# Patient Record
Sex: FEMALE | Race: WHITE | Employment: OTHER | URBAN - METROPOLITAN AREA
[De-identification: names, ages, dates, MRNs, and addresses within clinical notes are randomized per-mention and may not be internally consistent; named-entity substitution may affect disease eponyms.]

---

## 2019-04-20 ENCOUNTER — HOSPITAL ENCOUNTER (INPATIENT)
Facility: HOSPITAL | Age: 59
LOS: 3 days | Discharge: HOME | DRG: 871 | End: 2019-04-25
Attending: EMERGENCY MEDICINE | Admitting: HOSPITALIST
Payer: COMMERCIAL

## 2019-04-20 ENCOUNTER — APPOINTMENT (OUTPATIENT)
Dept: GENERAL RADIOLOGY | Facility: HOSPITAL | Age: 59
DRG: 871 | End: 2019-04-20
Attending: EMERGENCY MEDICINE
Payer: COMMERCIAL

## 2019-04-20 ENCOUNTER — APPOINTMENT (OUTPATIENT)
Dept: CT IMAGING | Facility: HOSPITAL | Age: 59
DRG: 871 | End: 2019-04-20
Attending: EMERGENCY MEDICINE
Payer: COMMERCIAL

## 2019-04-20 DIAGNOSIS — E86.0 DEHYDRATION: ICD-10-CM

## 2019-04-20 DIAGNOSIS — I95.9 HYPOTENSION, UNSPECIFIED HYPOTENSION TYPE: ICD-10-CM

## 2019-04-20 DIAGNOSIS — R10.9 ABDOMINAL PAIN OF UNKNOWN ETIOLOGY: Primary | ICD-10-CM

## 2019-04-20 PROCEDURE — 74176 CT ABD & PELVIS W/O CONTRAST: CPT | Performed by: EMERGENCY MEDICINE

## 2019-04-20 PROCEDURE — 99244 OFF/OP CNSLTJ NEW/EST MOD 40: CPT | Performed by: SURGERY

## 2019-04-20 PROCEDURE — 71045 X-RAY EXAM CHEST 1 VIEW: CPT | Performed by: EMERGENCY MEDICINE

## 2019-04-20 PROCEDURE — 99223 1ST HOSP IP/OBS HIGH 75: CPT | Performed by: HOSPITALIST

## 2019-04-20 RX ORDER — EZETIMIBE 10 MG/1
10 TABLET ORAL NIGHTLY
COMMUNITY

## 2019-04-20 RX ORDER — ROSUVASTATIN CALCIUM 20 MG/1
20 TABLET, COATED ORAL NIGHTLY
COMMUNITY

## 2019-04-20 RX ORDER — MONTELUKAST SODIUM 10 MG/1
10 TABLET ORAL NIGHTLY
COMMUNITY

## 2019-04-20 RX ORDER — DOCUSATE SODIUM 100 MG/1
100 CAPSULE, LIQUID FILLED ORAL DAILY PRN
COMMUNITY

## 2019-04-20 RX ORDER — POLYETHYLENE GLYCOL 3350 17 G/17G
17 POWDER, FOR SOLUTION ORAL DAILY PRN
Status: DISCONTINUED | OUTPATIENT
Start: 2019-04-20 | End: 2019-04-25

## 2019-04-20 RX ORDER — RANITIDINE 150 MG/1
150 TABLET ORAL 2 TIMES DAILY
COMMUNITY

## 2019-04-20 RX ORDER — SODIUM CHLORIDE 9 MG/ML
INJECTION, SOLUTION INTRAVENOUS CONTINUOUS
Status: DISCONTINUED | OUTPATIENT
Start: 2019-04-20 | End: 2019-04-22

## 2019-04-20 RX ORDER — INSULIN ASPART 100 [IU]/ML
INJECTION, SOLUTION INTRAVENOUS; SUBCUTANEOUS
COMMUNITY

## 2019-04-20 RX ORDER — INSULIN DETEMIR 100 [IU]/ML
36 INJECTION, SOLUTION SUBCUTANEOUS 2 TIMES DAILY
COMMUNITY

## 2019-04-20 RX ORDER — ACETAMINOPHEN 325 MG/1
650 TABLET ORAL EVERY 6 HOURS PRN
Status: DISCONTINUED | OUTPATIENT
Start: 2019-04-20 | End: 2019-04-23

## 2019-04-20 RX ORDER — SPIRONOLACTONE 25 MG/1
25 TABLET ORAL DAILY
COMMUNITY

## 2019-04-20 RX ORDER — BIOTIN 1 MG
1000 TABLET ORAL DAILY
COMMUNITY

## 2019-04-20 RX ORDER — FAMOTIDINE 20 MG/1
20 TABLET ORAL DAILY
Status: DISCONTINUED | OUTPATIENT
Start: 2019-04-20 | End: 2019-04-23

## 2019-04-20 RX ORDER — LOSARTAN POTASSIUM 50 MG/1
50 TABLET ORAL DAILY
COMMUNITY

## 2019-04-20 RX ORDER — FENOFIBRATE 67 MG/1
67 CAPSULE ORAL
Status: DISCONTINUED | OUTPATIENT
Start: 2019-04-21 | End: 2019-04-25

## 2019-04-20 RX ORDER — EZETIMIBE 10 MG/1
10 TABLET ORAL NIGHTLY
Status: DISCONTINUED | OUTPATIENT
Start: 2019-04-20 | End: 2019-04-25

## 2019-04-20 RX ORDER — METFORMIN HYDROCHLORIDE 500 MG/1
500 TABLET, EXTENDED RELEASE ORAL 2 TIMES DAILY
COMMUNITY

## 2019-04-20 RX ORDER — MONTELUKAST SODIUM 10 MG/1
10 TABLET ORAL NIGHTLY
Status: DISCONTINUED | OUTPATIENT
Start: 2019-04-20 | End: 2019-04-25

## 2019-04-20 RX ORDER — SODIUM CHLORIDE 9 MG/ML
INJECTION, SOLUTION INTRAVENOUS CONTINUOUS
Status: DISCONTINUED | OUTPATIENT
Start: 2019-04-20 | End: 2019-04-21

## 2019-04-20 RX ORDER — MELATONIN
1000 DAILY
COMMUNITY

## 2019-04-20 RX ORDER — METOCLOPRAMIDE HYDROCHLORIDE 5 MG/ML
5 INJECTION INTRAMUSCULAR; INTRAVENOUS EVERY 8 HOURS PRN
Status: DISCONTINUED | OUTPATIENT
Start: 2019-04-20 | End: 2019-04-25

## 2019-04-20 RX ORDER — ONDANSETRON 2 MG/ML
4 INJECTION INTRAMUSCULAR; INTRAVENOUS EVERY 6 HOURS PRN
Status: DISCONTINUED | OUTPATIENT
Start: 2019-04-20 | End: 2019-04-25

## 2019-04-20 RX ORDER — MULTIVITAMIN WITH IRON
250 TABLET ORAL DAILY
COMMUNITY

## 2019-04-20 RX ORDER — HYDROMORPHONE HYDROCHLORIDE 1 MG/ML
0.5 INJECTION, SOLUTION INTRAMUSCULAR; INTRAVENOUS; SUBCUTANEOUS ONCE
Status: COMPLETED | OUTPATIENT
Start: 2019-04-20 | End: 2019-04-20

## 2019-04-20 RX ORDER — HYDROCHLOROTHIAZIDE 25 MG/1
12.5 TABLET ORAL DAILY
COMMUNITY

## 2019-04-20 RX ORDER — MULTIVITAMIN/IRON/FOLIC ACID 18MG-0.4MG
250 TABLET ORAL DAILY
Status: DISCONTINUED | OUTPATIENT
Start: 2019-04-20 | End: 2019-04-25

## 2019-04-20 RX ORDER — ASPIRIN 81 MG/1
81 TABLET ORAL DAILY
Status: DISCONTINUED | OUTPATIENT
Start: 2019-04-20 | End: 2019-04-25

## 2019-04-20 RX ORDER — ONDANSETRON 2 MG/ML
4 INJECTION INTRAMUSCULAR; INTRAVENOUS ONCE
Status: COMPLETED | OUTPATIENT
Start: 2019-04-20 | End: 2019-04-20

## 2019-04-20 RX ORDER — ROSUVASTATIN CALCIUM 20 MG/1
20 TABLET, COATED ORAL NIGHTLY
Status: DISCONTINUED | OUTPATIENT
Start: 2019-04-20 | End: 2019-04-25

## 2019-04-20 NOTE — ED INITIAL ASSESSMENT (HPI)
Per  pt had an eye issue on Thursday,friday woke up nauseated, no appetite,today nauseated and dizzy, pt is diabetic on insulin, bs was 156, pt rubbing abd states very tender rlq, states pain is 9/10 abd pain

## 2019-04-20 NOTE — ED PROVIDER NOTES
Patient Seen in: [facility] Emergency Department    History   Patient presents with:  Abdomen/Flank Pain (GI/)  Fatigue (constitutional, neurologic)    Stated Complaint: General malise, abdominal pain, nausea     HPI    58-year-old white female wh discomfort but no acute distress. Vital signs show she is hypotensive otherwise vital signs are stable she is afebrile    Head is normocephalic atraumatic conjunctiva is clear. Sclerae anicteric. Neck is supple.     Lungs are clear to auscultation bila DIFFERENTIAL WITH PLATELET.   Procedure                               Abnormality         Status                     ---------                               -----------         ------                     CBC W/ DIFFERENTIAL[[id number]]          Abnormal likely present within the visualized   colon. ABDOMINAL WALL:  Some induration within the subcutaneous fat of the lower anterior pelvic wall is noted.  Correlation for prior injections in this region is suggested.   URINARY BLADDER:  The urinary bladder is her hypotension. Finally after a complete sepsis fluid bolus patient's blood pressure normalized. Reexam should patient continue to have abdominal pain in the right lower quadrant. I contacted the general surgeon Dr. Khadra Marsh and we discussed the case.   I

## 2019-04-21 ENCOUNTER — APPOINTMENT (OUTPATIENT)
Dept: GENERAL RADIOLOGY | Facility: HOSPITAL | Age: 59
DRG: 871 | End: 2019-04-21
Attending: NURSE PRACTITIONER
Payer: COMMERCIAL

## 2019-04-21 ENCOUNTER — APPOINTMENT (OUTPATIENT)
Dept: CV DIAGNOSTICS | Facility: HOSPITAL | Age: 59
DRG: 871 | End: 2019-04-21
Attending: NURSE PRACTITIONER
Payer: COMMERCIAL

## 2019-04-21 PROCEDURE — 93306 TTE W/DOPPLER COMPLETE: CPT | Performed by: NURSE PRACTITIONER

## 2019-04-21 PROCEDURE — 71045 X-RAY EXAM CHEST 1 VIEW: CPT | Performed by: NURSE PRACTITIONER

## 2019-04-21 PROCEDURE — 02HV33Z INSERTION OF INFUSION DEVICE INTO SUPERIOR VENA CAVA, PERCUTANEOUS APPROACH: ICD-10-PCS | Performed by: HOSPITALIST

## 2019-04-21 PROCEDURE — 99233 SBSQ HOSP IP/OBS HIGH 50: CPT | Performed by: STUDENT IN AN ORGANIZED HEALTH CARE EDUCATION/TRAINING PROGRAM

## 2019-04-21 PROCEDURE — 99225 SUBSEQUENT OBSERVATION CARE: CPT | Performed by: SURGERY

## 2019-04-21 RX ORDER — HYDROMORPHONE HYDROCHLORIDE 1 MG/ML
0.5 INJECTION, SOLUTION INTRAMUSCULAR; INTRAVENOUS; SUBCUTANEOUS EVERY 4 HOURS PRN
Status: DISCONTINUED | OUTPATIENT
Start: 2019-04-21 | End: 2019-04-22

## 2019-04-21 RX ORDER — TRAMADOL HYDROCHLORIDE 50 MG/1
50 TABLET ORAL EVERY 6 HOURS PRN
Status: DISCONTINUED | OUTPATIENT
Start: 2019-04-21 | End: 2019-04-25

## 2019-04-21 RX ORDER — SODIUM CHLORIDE 0.9 % (FLUSH) 0.9 %
10 SYRINGE (ML) INJECTION AS NEEDED
Status: DISCONTINUED | OUTPATIENT
Start: 2019-04-21 | End: 2019-04-25

## 2019-04-21 RX ORDER — HEPARIN SODIUM 5000 [USP'U]/ML
5000 INJECTION, SOLUTION INTRAVENOUS; SUBCUTANEOUS EVERY 8 HOURS SCHEDULED
Status: DISCONTINUED | OUTPATIENT
Start: 2019-04-21 | End: 2019-04-25

## 2019-04-21 RX ORDER — POTASSIUM CHLORIDE 20 MEQ/1
40 TABLET, EXTENDED RELEASE ORAL ONCE
Status: COMPLETED | OUTPATIENT
Start: 2019-04-21 | End: 2019-04-21

## 2019-04-21 RX ORDER — DEXTROSE MONOHYDRATE 25 G/50ML
50 INJECTION, SOLUTION INTRAVENOUS
Status: DISCONTINUED | OUTPATIENT
Start: 2019-04-21 | End: 2019-04-25

## 2019-04-21 RX ORDER — BISMUTH SUBSALICYLATE 262 MG/1
1 TABLET, CHEWABLE ORAL
Status: DISCONTINUED | OUTPATIENT
Start: 2019-04-21 | End: 2019-04-25

## 2019-04-21 NOTE — PROGRESS NOTES
[facility]  Progress Note     Kirk Dexter Patient Status:  Observation    1960 MRN [id number]   [facility] 4SW-A Attending Anupam Tiwari MD   Hosp Day # 0 PCP PHYSICIAN NONSTAFF     Chief Complaint: Abdominal pain     S: 3.375 g Intravenous Q8H   • aspirin  81 mg Oral Daily   • fenofibrate micronized  67 mg Oral Daily with breakfast   • ezetimibe  10 mg Oral Nightly   • insulin detemir  30 Units Subcutaneous BID   • magnesium  250 mg Oral Daily   • Montelukast Sodium  10 m

## 2019-04-21 NOTE — CONSULTS
[facility] Pharmacy Note:  Renal Dose Adjustment for Metoclopramide (REGLAN)    Beatrice Montalvo has been prescribed Metoclopramide (REGLAN) 10 mg every 8 hours as needed for nausea.     Estimated Creatinine Clearance: 33.2 mL/min (A) (based on SCr of 1.71 mg/dL

## 2019-04-21 NOTE — PLAN OF CARE
Patient received alert and oriented, on room air. Patient continues to have multiple large episodes of diarrhea throughout the day. ABD cramping present. Tylenol given to pain, fever PRN. Ultram given for ABD pain.  Pepto given for ABD cramping and distenti

## 2019-04-21 NOTE — CONSULTS
[facility] Patient Status:  Observation    1960 MRN [id number]   [facility] 4SW-A Attending Varun Prater MD   Hosp Day # 0 PCP PHYSICIAN NONSTAFF     Date of Admission: 2019  Admission Diagnosis: Dehydr Rfl:    Montelukast Sodium 10 MG Oral Tab Take 10 mg by mouth nightly. Disp:  Rfl:    Rosuvastatin Calcium 20 MG Oral Tab Take 20 mg by mouth nightly. Disp:  Rfl:    Losartan Potassium (COZAAR) 50 MG Oral Tab Take 50 mg by mouth daily.    Disp:  Rfl: mg, Oral, Daily with breakfast  •  ezetimibe (ZETIA) tab 10 mg, 10 mg, Oral, Nightly  •  insulin detemir (LEVEMIR) 100 UNIT/ML flextouch 30 Units, 30 Units, Subcutaneous, BID  •  magnesium oxide (MAG-OX) tab 250 mg, 250 mg, Oral, Daily  •  Montelukast Sodi 94%   BMI 30.81 kg/m²      Ventilator Settings: RA      Wt Readings from Last 3 Encounters:  04/21/19 : 190 lb 14.7 oz (86.6 kg)     No intake/output data recorded. I/O this shift:   In: 5168 [I.V.:5710]  Out: 1750 [Urine:1300; Stool:450]      Physical Exa prerenal  -monitor with IVF  3. Abdominal pain  -no clear etiology identified on CT scan  -surgery evaluated, no acute surgical intervention warranted currently  -follow up on stool cultures  4. DM:  -insulin per IM  5. FEN:  -ADAT  6.  Proph:  -SQ heparin

## 2019-04-21 NOTE — PROGRESS NOTES
NURSING ADMISSION NOTE      Patient admitted via Cart  Oriented to room. Safety precautions initiated. Bed in low position. Call light in reach. Patient had a BM and voided upon arrival. Complaining of a headache. Rocephin infusing. IVF infusing.

## 2019-04-21 NOTE — PROGRESS NOTES
0040: Patient sat up to side of bed and was dizzy. Encouraged patient to lie back down, checked patient's BP and it was 82/42. Paged Dr. Charmaine Zamorano, received orders to transfer patient to ICU for pressors. 0100: Called ICU RN, Unknown Repine to give report.  Repaged

## 2019-04-21 NOTE — CONSULTS
[facility] Pharmacy Note:  Renal Dose Adjustment    Luana Rubio has been prescribed famotidine (PEPCID) 20 mg orally every 12 hours. Estimated Creatinine Clearance: 33.2 mL/min (A) (based on SCr of 1.71 mg/dL (H)).     Her calculated creatinine clearance

## 2019-04-21 NOTE — PLAN OF CARE
Patient received alert and oriented on room air. Received patient on levophed. Titrating for MAP goal >65. PIV to R AC positional and painful with flushing for patient. APRN at bedside for PICC line placement. Now titrating levophed for SBP >90 per order.

## 2019-04-21 NOTE — PROGRESS NOTES
ICU  Critical Care APRN Progress Note    NAME: Isac Goss - ROOM: [address] [place], [place] [address]A - MRN: [id number] - Age: 60year old - YRM:9/08/2024    History Of Present Illness:  Isac Goss is a 60year old female with PMHx significant for bilateral mastectomy, 190 lb 14.7 oz (86.6 kg)   SpO2 93%   BMI 30.81 kg/m²   Physical Exam:    General Appearance: Alert, cooperative, no acute distress  Lungs: Diminished bilaterally, respirations unlabored  Heart: Regular rate and rhythm, S1 and S2 normal  Abdomen: Soft, rig well as likely intravascular depletion from ? over diureses at home   -Received sepsis bolus prior to ICU arrival  -Give additional one liter IVF bolus now  -Vasopressors if continued hypotension  -Check lactic acid and PCT  -Follow cultures   -RVP results

## 2019-04-21 NOTE — H&P
[facility]  History and Physical     Tiana Madison Patient Status:  Observation    1960 MRN [id number]   [facility] 3NW-A Attending Kit MD Tate   Hosp Day # 0 PCP PHYSICIAN NONSTAFF     Chief Complaint: Weakness, n Potassium (COZAAR) 50 MG Oral Tab Take 50 mg by mouth daily. Disp:  Rfl:    ezetimibe 10 MG Oral Tab Take 10 mg by mouth nightly. Disp:  Rfl:    spironolactone 25 MG Oral Tab Take 25 mg by mouth daily.  Disp:  Rfl:    Choline Fenofibrate 45 MG Oral Capsul nondistended. Positive bowel sounds. No rebound, guarding or organomegaly. Neurologic: No focal neurological deficits. CNII-XII grossly intact. Musculoskeletal: Moves all extremities. Extremities: No edema or cyanosis.   Integument: No rashes or lesions

## 2019-04-21 NOTE — PLAN OF CARE
Patient received @0200. Tolerating RA. Voiding via bedpan. Levo started to keep MAP greater then 65. See flowsheet for assessment details.  Tylenol for pain, will monitor

## 2019-04-21 NOTE — CONSULTS
[facility]  Report of Consultation    Render Sports Patient Status:  Observation    1960 MRN [id number]   [facility] 3NW-A Attending Maicol Romeo MD   Hosp Day # 0 PCP PHYSICIAN NONSTAFF     Date of consultation:      April ABDOM HYSTERECTOMY       No family history on file. reports that she has never smoked.  She has never used smokeless tobacco.    Allergies:    Morphine                NAUSEA AND VOMITING    Medications:    Current Facility-Administered Medications:   •  a hematuria  Hema/Lymph:  Negative for easy bleeding and easy bruising  Integumentary:  Negative for pruritus and rash, changing pigmented moles, lesions  Musculoskeletal:  Negative for bone/joint symptoms, negative for muscle aches or cramping  Neurological esterase   Chest x-ray negative  CT scan abdomen and pelvis reveals liquid stool in the colon which may represent enteritis-no bowel wall thickening is noted-.   Appendix appears normal-some bladder thickening consistent with possible cystitis  Etiology of

## 2019-04-22 ENCOUNTER — APPOINTMENT (OUTPATIENT)
Dept: CT IMAGING | Facility: HOSPITAL | Age: 59
DRG: 871 | End: 2019-04-22
Attending: NURSE PRACTITIONER
Payer: COMMERCIAL

## 2019-04-22 PROCEDURE — 74177 CT ABD & PELVIS W/CONTRAST: CPT | Performed by: NURSE PRACTITIONER

## 2019-04-22 PROCEDURE — 99232 SBSQ HOSP IP/OBS MODERATE 35: CPT | Performed by: STUDENT IN AN ORGANIZED HEALTH CARE EDUCATION/TRAINING PROGRAM

## 2019-04-22 PROCEDURE — 99233 SBSQ HOSP IP/OBS HIGH 50: CPT | Performed by: SURGERY

## 2019-04-22 RX ORDER — DEXTROSE AND SODIUM CHLORIDE 5; .45 G/100ML; G/100ML
INJECTION, SOLUTION INTRAVENOUS CONTINUOUS
Status: DISCONTINUED | OUTPATIENT
Start: 2019-04-22 | End: 2019-04-24

## 2019-04-22 RX ORDER — METRONIDAZOLE 500 MG/100ML
500 INJECTION, SOLUTION INTRAVENOUS EVERY 8 HOURS
Status: DISCONTINUED | OUTPATIENT
Start: 2019-04-22 | End: 2019-04-25

## 2019-04-22 RX ORDER — POTASSIUM CHLORIDE 14.9 MG/ML
20 INJECTION INTRAVENOUS ONCE
Status: COMPLETED | OUTPATIENT
Start: 2019-04-22 | End: 2019-04-22

## 2019-04-22 RX ORDER — HYDROMORPHONE HYDROCHLORIDE 1 MG/ML
0.5 INJECTION, SOLUTION INTRAMUSCULAR; INTRAVENOUS; SUBCUTANEOUS EVERY 2 HOUR PRN
Status: DISCONTINUED | OUTPATIENT
Start: 2019-04-22 | End: 2019-04-25

## 2019-04-22 RX ORDER — DICYCLOMINE HYDROCHLORIDE 10 MG/1
10 CAPSULE ORAL
Status: DISCONTINUED | OUTPATIENT
Start: 2019-04-22 | End: 2019-04-25

## 2019-04-22 RX ORDER — SODIUM CHLORIDE 9 MG/ML
INJECTION, SOLUTION INTRAVENOUS CONTINUOUS
Status: DISCONTINUED | OUTPATIENT
Start: 2019-04-22 | End: 2019-04-22

## 2019-04-22 NOTE — PROGRESS NOTES
Pulmonary Progress Note        NAME: Carlos Alberto [facility] - ROOM: 453/453-A - MRN: [id number] - Age: 60year old - : 1960        Last 24hrs: No events overnight, still noting some ab cramping but otherwise feels ok    OBJECTIVE:   19  0400  8.5*   MCV 86.7 88.3 89.5   .0 198.0 169.0   INR  --  1.15*  --        Recent Labs     04/20/19  1642 04/21/19  0254 04/21/19  0712 04/22/19  0454   * 136 139 140   K 4.1 4.1 3.8 3.8    110 112 113*   CO2 24.0 19.0* 19.0* 20.0*   BUN 44*

## 2019-04-22 NOTE — PLAN OF CARE
Patient received alert and oriented on room air. Patient continues to c/o ABD cramping and back pain. Tylenol given for pain, pepto given for ABD cramping. Hypoglycemia noted this AM treated per protocol. Levemir held per hospitalist order.  Levophed remain

## 2019-04-22 NOTE — PROGRESS NOTES
Pt arrived from ICU around 1130am. Pt c.o more abdominal pain, cramping, distention, and bloating. States she feels worse that she did when she first got here. Abdomen is very distended, tympanic and pt is very tender. GI consult obtained.  See GI and gener

## 2019-04-22 NOTE — PROGRESS NOTES
[facility]  Progress Note    Jes Guevara Patient Status:  Observation    1960 MRN [id number]   [facility] 5NW-A Attending Karen Gray MD   Hosp Day # 0 PCP PHYSICIAN NONSTAFF     Subjective:   The patient states that she i H/O bilateral salpingectomy     H/O: hysterectomy     HX: breast cancer     Diabetes mellitus ([facility] [address]     Septic shock (HCC)     Urinary tract infection without hematuria     Diarrhea of presumed infectious origin    Abdominal pain and bloating, Diarrhea

## 2019-04-22 NOTE — PLAN OF CARE
Problem: Diabetes/Glucose Control  Goal: Glucose maintained within prescribed range  Description  INTERVENTIONS:  - Monitor Blood Glucose as ordered  - Assess for signs and symptoms of hyperglycemia and hypoglycemia  - Administer ordered medications to m appropriate  - Advance diet as tolerated, if ordered  - Obtain nutritional consult as needed  - Evaluate fluid balance  Outcome: Progressing  Goal: Maintains or returns to baseline bowel function  Description  INTERVENTIONS:  - Assess bowel function  - Anders Siddiqi diarrhea stool so far - awaiting results from shigatoxin. Ate most of dinner. Earlier with back pain unresponsive to both tylenol and ultram - critical care APN notified and Dilaudid given and is presently sleeping. Active bowel sounds.   Continent of ur

## 2019-04-22 NOTE — PROGRESS NOTES
[facility]  Progress Note    Mervin Tapia Patient Status:  Observation    1960 MRN [id number]   [facility] 4SW-A Attending Ramandeep Mccauley MD   Hosp Day # 0 PCP PHYSICIAN NONSTAFF     Subjective:  Patient continues to complain fed for pressure support   Workup thus far negative except for UTI with positive UA  CBC, lactic acid, CMP, CT scan abdomen and pelvis have been negative  Creatinine remains slightly elevated but improving-WBC normal  This morning patient developed new ons

## 2019-04-22 NOTE — PROGRESS NOTES
CT scan done  Discussed with Dr Vimal Ochoa  Mild diffuse edema of the sigmoid and rectum- does not appear to be ischemic   CT scan results discussed with Dr. Deysi Lees will manage colitis medically  No indication for surgery at this time  Repeat C. difficil

## 2019-04-22 NOTE — PROGRESS NOTES
Pt vomiting up first half of contrast as well as her breakfast, PRN Zofran given. GI MD paged regarding further instructions. Awaiting response.

## 2019-04-22 NOTE — PROGRESS NOTES
[facility]  Progress Note     Carmelina Hernandez Patient Status:  Observation    1960 MRN [id number]   [facility] 4SW-A Attending Leesa Curtis MD   Hosp Day # 0 PCP PHYSICIAN NONSTAFF     Chief Complaint: Abdominal pain     S: 20 Units Subcutaneous BID   • Heparin Sodium (Porcine)  5,000 Units Subcutaneous Q8H [facility]   • piperacillin-tazobactam  3.375 g Intravenous Q8H   • Insulin Aspart Pen  1-5 Units Subcutaneous TID CC and HS   • aspirin  81 mg Oral Daily   • fenofibrate microni

## 2019-04-22 NOTE — CONSULTS
[facility]                       [facility]enterology Consultation-[facility]    Luz Elena Ramiro Patient Status:  Observation    1960 MRN [id number]   [facility] 5NW-A Attending Toma Hall MD   Hosp Day # 0 PCP [facility] reports undergoing a screening colonoscopy 12/2018 which revealed several polyps and \"adhesions\". CT a/p without contrast 4/20 suggested moderate fecal material in distal colon without thickening and possible cystitis.  Labs revealed normal lactic acid, W Subcutaneous TID CC and HS   traMADol HCl (ULTRAM) tab 50 mg 50 mg Oral Q6H PRN   Bismuth Subsalicylate (PEPTO-BISMOL) chew tab CHEW 262 mg 1 tablet Oral Q1H PRN   HYDROmorphone HCl (DILAUDID) 1 MG/ML injection 0.5 mg 0.5 mg Intravenous Q4H PRN   [COMPLETE recurrent pneumonia            Hematologic: The patient reports no easy bruising, frequent gum bleeding or nose bleeding;   The patient has no history of known chronic anemia            Dermatologic: The patient reports no recent rashes or chronic skin diso 22 04/22/2019     04/22/2019    K 3.8 04/22/2019     04/22/2019    CO2 20.0 04/22/2019    GLU 74 04/22/2019    CA 7.7 04/22/2019    ALB 2.5 04/22/2019    ALKPHO 113 04/22/2019    BILT 0.3 04/22/2019    AST 32 04/22/2019    ALT 37 04/22/2019 Unremarkable as seen on non-contrast imaging. RETROPERITONEUM:  No mass or adenopathy. BOWEL/MESENTERY:  Normal-appearing appendix. No dilated loops of small bowel are seen. Moderate amount of fecal material present within the distal colon.   No fr without contrast 4/20 suggests moderate fecal material in distal colon without thickening and possible cystitis. Labs revealed normal lactic acid (last 4/21), WBC count.  She has been transferred out of ICU however continues to have poor PO intake, ongoing with prominent lymphoid aggregate  C. Sigmoid colon, bx: Tubular adenoma     Moises Patient, APRN  1:34 PM  4/22/2019  [facility]  [phone]    I have personally seen and examined the patient.   I agree with the plan as outlined above by wyatt

## 2019-04-23 ENCOUNTER — APPOINTMENT (OUTPATIENT)
Dept: GENERAL RADIOLOGY | Facility: HOSPITAL | Age: 59
DRG: 871 | End: 2019-04-23
Attending: STUDENT IN AN ORGANIZED HEALTH CARE EDUCATION/TRAINING PROGRAM
Payer: COMMERCIAL

## 2019-04-23 ENCOUNTER — APPOINTMENT (OUTPATIENT)
Dept: GENERAL RADIOLOGY | Facility: HOSPITAL | Age: 59
DRG: 871 | End: 2019-04-23
Attending: INTERNAL MEDICINE
Payer: COMMERCIAL

## 2019-04-23 PROCEDURE — 74018 RADEX ABDOMEN 1 VIEW: CPT | Performed by: INTERNAL MEDICINE

## 2019-04-23 PROCEDURE — 99232 SBSQ HOSP IP/OBS MODERATE 35: CPT | Performed by: PHYSICIAN ASSISTANT

## 2019-04-23 PROCEDURE — 71045 X-RAY EXAM CHEST 1 VIEW: CPT | Performed by: STUDENT IN AN ORGANIZED HEALTH CARE EDUCATION/TRAINING PROGRAM

## 2019-04-23 PROCEDURE — 99232 SBSQ HOSP IP/OBS MODERATE 35: CPT | Performed by: SURGERY

## 2019-04-23 PROCEDURE — 99232 SBSQ HOSP IP/OBS MODERATE 35: CPT | Performed by: STUDENT IN AN ORGANIZED HEALTH CARE EDUCATION/TRAINING PROGRAM

## 2019-04-23 RX ORDER — FAMOTIDINE 20 MG/1
20 TABLET ORAL DAILY PRN
Status: DISCONTINUED | OUTPATIENT
Start: 2019-04-23 | End: 2019-04-25

## 2019-04-23 RX ORDER — ACETAMINOPHEN 10 MG/ML
1000 INJECTION, SOLUTION INTRAVENOUS EVERY 8 HOURS PRN
Status: ACTIVE | OUTPATIENT
Start: 2019-04-23 | End: 2019-04-24

## 2019-04-23 RX ORDER — POTASSIUM CHLORIDE 29.8 MG/ML
40 INJECTION INTRAVENOUS ONCE
Status: COMPLETED | OUTPATIENT
Start: 2019-04-23 | End: 2019-04-23

## 2019-04-23 RX ORDER — CALCIUM CARBONATE 200(500)MG
TABLET,CHEWABLE ORAL 3 TIMES DAILY PRN
Status: DISCONTINUED | OUTPATIENT
Start: 2019-04-23 | End: 2019-04-25

## 2019-04-23 NOTE — PLAN OF CARE
Problem: Diabetes/Glucose Control  Goal: Glucose maintained within prescribed range  Description  INTERVENTIONS:  - Monitor Blood Glucose as ordered  - Assess for signs and symptoms of hyperglycemia and hypoglycemia  - Administer ordered medications to m Provide nonpharmacologic comfort measures as appropriate  - Advance diet as tolerated, if ordered  - Obtain nutritional consult as needed  - Evaluate fluid balance  Outcome: Progressing  Goal: Maintains or returns to baseline bowel function  Description  I Heparin, refusing SCDs. Distended, rounded abdomen, loose BMs during shift, sample sent to r/o C.diff. Antiemetics administered with relief per EMar. Contact precautions plus initiated and maintained. PRN pain medications administered with relief.   Up wi

## 2019-04-23 NOTE — PROGRESS NOTES
[facility]  Progress Note     Kash Lawrence Patient Status:  Observation    1960 MRN [id number]   [facility] 4SW-A Attending Siria Amanda MD   Hosp Day # 1 PCP PHYSICIAN NONSTAFF     Chief Complaint: Abdominal pain     S: • insulin detemir  5 Units Subcutaneous Nightly   • dicyclomine  10 mg Oral TID AC&HS   • metRONIDAZOLE  500 mg Intravenous Q8H   • Heparin Sodium (Porcine)  5,000 Units Subcutaneous Q8H [facility]   • piperacillin-tazobactam  3.375 g Intravenous Q8H   • Insuli

## 2019-04-23 NOTE — PROGRESS NOTES
[facility]  Progress Note    Kirk Dexter Patient Status:  Inpatient    1960 MRN [id number]   [facility] 5NW-A Attending Anupam Tiwari MD   Hosp Day # 1 PCP PHYSICIAN NONSTAFF     Subjective:   The patient states she feels no techniques were used. Dose information is   transmitted to the [facility] [employer] [facility]) NRDR ([address]) which includes the Dose Index Registry.      PATIENT STATED HISTORY:(As transcribed by Technologist)        CONTRAST U bilateral salpingectomy     H/O: hysterectomy     HX: breast cancer     Diabetes mellitus ([facility] [address]     Septic shock (HCC)     Urinary tract infection without hematuria     Diarrhea of presumed infectious origin     Abdominal distension    Colitis of sigmoid c

## 2019-04-23 NOTE — PLAN OF CARE
Problem: Diabetes/Glucose Control  Goal: Glucose maintained within prescribed range  Description  INTERVENTIONS:  - Monitor Blood Glucose as ordered  - Assess for signs and symptoms of hyperglycemia and hypoglycemia  - Administer ordered medications to m antiemetic medications  - Provide nonpharmacologic comfort measures as appropriate  - Advance diet as tolerated, if ordered  - Obtain nutritional consult as needed  - Evaluate fluid balance  Outcome: Progressing  Goal: Maintains or returns to baseline brinda sats on RA. VSS. Tele-NSR/ST. Pt still having loose BMs,  reporting that they are becoming more formed. Advanced to clear liquids per GI, tolerating at this time. Voids, up standby. IVF infusing. IVABX. PICC line.  Some fluid noted to be leaking from PICC l

## 2019-04-23 NOTE — PROGRESS NOTES
Gastroenterology Progress Note  Patient Name: Mervin Tapia  Chief Complaint: Abdominal pain, diarrhea  S: The patient reports continued intermittent non-bloody, watery diarrhea. She reports continued abdominal distention.  No n/v.   O: /41 (BP L dome of the diaphragm to the pubic symphysis with non-ionic intravenous contrast material. Post contrast coronal MPR imaging was performed.  Dose reduction techniques were used.  Dose information is   transmitted to the [facility] [employer]) N This is a patient who presented with dizziness, nausea, and subsequent abdominal pain and diarrhea with hypotension requiring a brief period of pressor support. She has developed abdominal distention. Yesterday, she had peritoneal signs.   Today, she rem

## 2019-04-24 ENCOUNTER — APPOINTMENT (OUTPATIENT)
Dept: GENERAL RADIOLOGY | Facility: HOSPITAL | Age: 59
DRG: 871 | End: 2019-04-24
Attending: NURSE PRACTITIONER
Payer: COMMERCIAL

## 2019-04-24 PROCEDURE — 74019 RADEX ABDOMEN 2 VIEWS: CPT | Performed by: NURSE PRACTITIONER

## 2019-04-24 PROCEDURE — 99232 SBSQ HOSP IP/OBS MODERATE 35: CPT | Performed by: STUDENT IN AN ORGANIZED HEALTH CARE EDUCATION/TRAINING PROGRAM

## 2019-04-24 PROCEDURE — 99232 SBSQ HOSP IP/OBS MODERATE 35: CPT | Performed by: SURGERY

## 2019-04-24 RX ORDER — POTASSIUM CHLORIDE 20 MEQ/1
40 TABLET, EXTENDED RELEASE ORAL EVERY 4 HOURS
Status: ACTIVE | OUTPATIENT
Start: 2019-04-24 | End: 2019-04-24

## 2019-04-24 NOTE — PAYOR COMM NOTE
--------------  ADMISSION REVIEW     Payor: [facility]  Subscriber #:  [id number]  Authorization Number: [id number]    Admit date: 4/22/19  Admit time: 46       Admit Orders (From admission, onward)    Start     Ordered    04/22/19 1146  A VOMITING    Current Outpatient Medications on File Prior to Encounter:  insulin aspart 100 UNIT/ML Subcutaneous Solution Inject into the skin 3 (three) times daily before meals. Disp:  Rfl:    Montelukast Sodium 10 MG Oral Tab Take 10 mg by mouth nightly. auscultation bilaterally. No wheezes. No rhonchi. Cardiovascular: S1, S2. Regular rate and rhythm. No murmurs, rubs or gallops. Equal pulses. Chest and Back: No tenderness or deformity. Abdomen: Soft, nontender, nondistended. Positive bowel sounds.  No WBC 7.0 7.0 7.7   HGB 12.5 9.7* 8.5*   MCV 86.7 88.3 89.5   .0 198.0 169.0   INR  --  1.15*  --                 Recent Labs   Lab 04/20/19  1642 04/21/19  0254 04/21/19  0712 04/22/19  0454   * 112* 120* 74   BUN 44* 39* 39* 22*   LUZMARIA Last 6 Encounters:  04/23/19 : 204 lb 12.8 oz (92.9 kg)        Physical Exam:    General: No acute distress. Respiratory: Clear to auscultation bilaterally. No wheezes. No rhonchi. Cardiovascular: S1, S2. Regular rate and rhythm.  No murmurs, rubs or gal and Sx on Cs  2. IVF  3. IV Abx  4. Supportive  care  5.  Non surgical      Plan of care:   Pip tazo for colitis  XRAY abdomen (p)  Trial IV tylenol for pain, does not tolerate IV MO, IV dilaudid is giving her a lot of nausea  Decrease Levemir to 5 u QHS    dicyclomine  10 mg Oral TID AC&HS   • metRONIDAZOLE  500 mg Intravenous Q8H   • Heparin Sodium (Porcine)  5,000 Units Subcutaneous Q8H [facility]   • piperacillin-tazobactam  3.375 g Intravenous Q8H   • Insulin Aspart Pen  1-5 Units Subcutaneous TID CC and HS   • Subcutaneous (Left Lower Abdomen)     4/23/2019 2047 Given 5000 Units Subcutaneous (Left Lower Abdomen)     4/23/2019 1453 Given 5000 Units Subcutaneous (Left Lower Abdomen)       insulin detemir (LEVEMIR) 100 UNIT/ML flextouch 5 Units     Date Action Dose

## 2019-04-24 NOTE — PLAN OF CARE
Pt alert and orientated. Oxygen WNL on room air. NSR per tele. Pt with complaints of a headache this shift, PRN pain medications given. All medications given per [facility]. IVF infusing. Pt tolerating CLD, accuchecks. PICC line in place, leakage noted.  Will have coronary artery perfusion - ex.  Angina  - Evaluate fluid balance, assess for edema, trend weights  Outcome: Progressing     Problem: GASTROINTESTINAL - ADULT  Goal: Minimal or absence of nausea and vomiting  Description  INTERVENTIONS:  - Maintain adequate evaluate response  - Consider cultural and social influences on pain and pain management  - Manage/alleviate anxiety  - Utilize distraction and/or relaxation techniques  - Monitor for opioid side effects  - Notify MD/LIP if interventions unsuccessful or pa

## 2019-04-24 NOTE — PROGRESS NOTES
Gastroenterology Progress Note  Patient Name: Navin Reyes  Chief Complaint: Abdominal pain, diarrhea, colitis  S: The patient reports that she had some improved form to her stool overnight, and a large flatulent evacuation.   She feels better this mor area.         FINDINGS:    BOWEL GAS PATTERN:  Diffuse air-filled distention of small bowel and colon throughout the abdomen is noted.  There is a distended loop of colon in the mid abdomen measuring 8.1 cm in diameter.  This likely represents sigmoid colo

## 2019-04-24 NOTE — PLAN OF CARE
Problem: Diabetes/Glucose Control  Goal: Glucose maintained within prescribed range  Description  INTERVENTIONS:  - Monitor Blood Glucose as ordered  - Assess for signs and symptoms of hyperglycemia and hypoglycemia  - Administer ordered medications to m as ordered  - Evaluate effectiveness of ordered antiemetic medications  - Provide nonpharmacologic comfort measures as appropriate  - Advance diet as tolerated, if ordered  - Obtain nutritional consult as needed  - Evaluate fluid balance  Outcome: Progress appropriate  Outcome: Progressing      Pt aox4, maintaining O2 sats on RA. VSS. Tele-NSR. Pt reports decreased number of stools. Tolerating diet. Up standby. IVF stopped. PICC to R arm- some leakage noted. Assessed by PICC nurse. IVABX.  Pt and  [place]

## 2019-04-24 NOTE — VASCULAR ACCESS
Called to see pt to assess PICC line. Nurse states that PICC was leaking at site yesterday. CXR done yesterday and shows PICC tip in SVC. All 3 ports flushed and all ports with good blood return. New blue caps placed to all lumens.   Dressing changed an

## 2019-04-24 NOTE — PROGRESS NOTES
[facility]  Progress Note     Mey Mcbride Patient Status:  Observation    1960 MRN [id number]   [facility] 4SW-A Attending Melonie Geronimo MD   Hosp Day # 2 PCP PHYSICIAN NONSTAFF     Chief Complaint: Abdominal pain     S: 04/20/19  1642   TROP <0.045            Imaging: Imaging data reviewed in Epic.     Medications:   • Potassium Chloride ER  40 mEq Oral Q4H   • insulin detemir  5 Units Subcutaneous Nightly   • pantoprazole (PROTONIX) IV push  40 mg Intravenous Q24H   • dic

## 2019-04-24 NOTE — PROGRESS NOTES
[facility]  Progress Note    Willye Grumbling Patient Status:  Inpatient    1960 MRN [id number]   [facility] 5NW-A Attending Jamarcus Lee MD   Hosp Day # 2 PCP PHYSICIAN NONSTAFF     Subjective:  Patient states abdominal pain h Abdominal pain, diarrhea, colitis      Assessment/Plan:   Abdominal pain has improved slightly today however diarrhea persists. Patient's hemoglobin did drift down to 7.4 this morning.   This is probably multifactorial with acute illness, dilution  We will

## 2019-04-25 VITALS
HEART RATE: 53 BPM | BODY MASS INDEX: 32.28 KG/M2 | DIASTOLIC BLOOD PRESSURE: 68 MMHG | RESPIRATION RATE: 18 BRPM | WEIGHT: 200.88 LBS | OXYGEN SATURATION: 99 % | SYSTOLIC BLOOD PRESSURE: 156 MMHG | TEMPERATURE: 99 F | HEIGHT: 66 IN

## 2019-04-25 PROCEDURE — 99232 SBSQ HOSP IP/OBS MODERATE 35: CPT | Performed by: PHYSICIAN ASSISTANT

## 2019-04-25 RX ORDER — METOCLOPRAMIDE HYDROCHLORIDE 5 MG/ML
10 INJECTION INTRAMUSCULAR; INTRAVENOUS EVERY 8 HOURS PRN
Status: DISCONTINUED | OUTPATIENT
Start: 2019-04-25 | End: 2019-04-25

## 2019-04-25 RX ORDER — AMOXICILLIN AND CLAVULANATE POTASSIUM 875; 125 MG/1; MG/1
1 TABLET, FILM COATED ORAL 2 TIMES DAILY
Qty: 12 TABLET | Refills: 0 | Status: SHIPPED | OUTPATIENT
Start: 2019-04-25 | End: 2019-05-01

## 2019-04-25 NOTE — PROGRESS NOTES
Pharmacy renal dose adjustment for metoclopramide (Reglan)    [facility] [place]orquidea has been prescribed metoclopramide 5 mg every 8 hours for nausea/vomiting. Estimated Creatinine Clearance: 54.8 mL/min (based on SCr of 0.77 mg/dL).     Her estimated creati

## 2019-04-25 NOTE — PROGRESS NOTES
Gastroenterology Progress Note  Patient Name: Mya Luna  Chief Complaint: Abdominal pain, diarrhea, colitis  S: The patient reports that she is feeling much better today. She is ambulating, and reports no abdominal pain.   She ate eggs this morning with her GI physician in [place]. We will sign-off. Thank you.

## 2019-04-25 NOTE — PROGRESS NOTES
[facility]  Progress Note    Eli Champagne Patient Status:  Inpatient    1960 MRN [id number]   [facility] 5NW-A Attending Rangel Altman MD   Hosp Day # 3 PCP PHYSICIAN NONSTAFF     Subjective:  Patient having formed BMs, fl Colitis     Anemia      Colitis with abdominal pain - resolving    Plan:  1. The patient is tolerating an advanced diet without pain and with excellent bowel function. 2. No surgical intervention indicated. 3. May d/c from a surgical standpoint.   4. Jaziel

## 2019-04-25 NOTE — PLAN OF CARE
Received patient A/O x4, VSS with no complaints of pain. Patient is up to the bathroom with a steady gait. PICC tot he right arm leaking. Dressing changed. PICC, RN assessed PICC, okay to use.  Patient stated that PICC site is itching and wants PICC to be r venous puncture sites for bleeding and/or hematoma  - Assess quality of pulses, skin color and temperature  - Assess for signs of decreased coronary artery perfusion - ex.  Angina  - Evaluate fluid balance, assess for edema, trend weights  Outcome: Progress

## 2019-04-26 NOTE — DISCHARGE SUMMARY
[facility] [employer] HOSPITAL[facility]  DISCHARGE SUMMARY     Sourav Serna Patient Status:  Inpatient    1960 MRN [id number]   [facility] 5NW-A Attending No att. providers found   Hosp Day # 3 PCP PHYSICIAN NONSTAFF     Date of Admission: 2019 • None    Incidental or significant findings and recommendations (brief descriptions):  • none    Lab/Test results pending at Discharge:   · none    Consultants:  • GI, Critical care     Discharge Medication List:     Discharge Medications      START hung daily. Refills:  0     Rosuvastatin Calcium 20 MG Tabs  Commonly known as:  CRESTOR      Take 20 mg by mouth nightly. Refills:  0     SITagliptin Phosphate 100 MG Tabs  Commonly known as:  JANUVIA      Take 100 mg by mouth daily.    Refills:  0     spir

## 2025-05-12 NOTE — CM/SW NOTE
04/22/19 1500   CM/SW Screening   Referral Source    Information Source Chart review   Patient's Mental Status Alert;Oriented   Patient's [address]   Patient lives with Spouse   Patient Status Prior to Admission   Independent wi 100